# Patient Record
Sex: FEMALE | Race: WHITE | Employment: OTHER | URBAN - METROPOLITAN AREA
[De-identification: names, ages, dates, MRNs, and addresses within clinical notes are randomized per-mention and may not be internally consistent; named-entity substitution may affect disease eponyms.]

---

## 2018-03-28 ENCOUNTER — HOSPITAL ENCOUNTER (OUTPATIENT)
Dept: PHYSICAL THERAPY | Age: 63
Discharge: HOME | End: 2018-03-28
Payer: MEDICARE

## 2018-03-28 PROCEDURE — 97165 OT EVAL LOW COMPLEX 30 MIN: CPT

## 2018-03-28 PROCEDURE — G8985 CARRY GOAL STATUS: HCPCS

## 2018-03-28 PROCEDURE — G8984 CARRY CURRENT STATUS: HCPCS

## 2018-03-28 PROCEDURE — 97110 THERAPEUTIC EXERCISES: CPT

## 2018-03-28 NOTE — PROGRESS NOTES
Hand Therapy Evaluation and Daily Note    Patient Name: Kelle Kerns  Date:3/28/2018  : 1955  Age: 70 y.o.y/o  [x]  Patient  Verified  Payor: [place] MEDICARE / Plan: [place] MEDICARE PART A & B / Product Type: Medicare /    Referring Provider: MD Brett Savage MD Visit:  4 weeks  Onset Date:  1 year ago  Surgical Date: N/A  Surgical Procedure: N/A    In time: 3:40  Out time: 4:15  Total Treatment Time (min): 35  Total Timed Codes (min): 35  1:1 Treatment Time ([facility] only): 35   Visit #: 1 of 6    Treatment Area: Right hand pain [M79.641]  Left hand pain [M79.642]    Precautions:    Hand Dominance: left handed   Hand Involved: bilateral    Total Evaluation Time:  25    History of Present Condition:  Patient is a 70 y.o. female with a chief complaint  of Baltazar hand pain and stiffness. Pt reports she began experiencing symptoms about 1 year ago. She recently reported X-rays were taken and revealed degenerative changes in baltazar hands with no abnormalities. She demonstrated decreased ability to make a tight full fist with right hand due to Chinmay's node on MF. Mild strength deficits noted in dominant L hand  and pinch. Pt provided and instructed in HEP including intrinsic stretches and digit AROM. Skilled OT services are warranted to follow-up with pt on HEP, to decrease baltazar hand pain, and improve dexterity to improve quality of life. Pain Rating:   Current: (0-no pain 10-debilitating pain) no   At best: (0-no pain 10-debilitating pain) no  At worst: (0-no pain 10-debilitating pain) mild  Location: bilateral hand  Type:  no   Better with: activity  Worse with: inactive    Medications/Allergies/Past Medical History:  See chart; reviewed with patient. HTN    Diagnostic Tests: X-rays completed 3/27/2018 revealing degenerative changes in joints.     Prior Level of Function: Independent with ADL and IADL activities without functional limitations    Current Level of Function:  Decreased finger mobility with functional tasks. Social History: Pt reports she lives with  in home. Occupation/Job Requirements: Retired; still travels twice a month for [employer] around country    Observation: Heberden's nodes and Chinmay's nodes on digits on Baltazar hands.   Scar/incision:   N/A  Location:  Baltazar hands     Palpation:  No tenderness with palpation to baltazar digits    Range of Motion:  Right Hand ROM   2nd 3rd 4th 5th Thumb   MP 90 90  90 90     90 90 90    DIP 55 70 90 90    PABD 245 250 270 270    RABD        COMBS          Left Hand ROM   2nd 3rd 4th 5th Thumb   MP 90 90 90 90     100 90 100    DIP 70 70 70 90    PABD        RABD        COMBS 260 260 250 280        Strength:   Measurements: Taken with Gabe Dynamometer, in Lbs   Level 2 3/28/2018  Date Date Date Date Date Date   Right 40         Left 35         Deficit 5         Change                Pinch Measurements: Taken with Pinch Gauge, in Lbs   (hand) 3/28/2018  Date Date Date Date Date   Lateral          Right 12        Left  11        Deficit 1        Change         Pad         Right 9        Left 7        Deficit         Change         Estiven         Right 10        Left 8        Deficit 2        Change           Sensation:    intact    Edema: Not tested    Special Tests:    Provocative test: Collateral Ligament tests: negative    ADLs  Feeding:        []MaxA   []ModA   []Tyron   [] CGA   []SBA   []Sophy   [x]Independent  UE Dressing:       []MaxA   []ModA   []Tyron   [] CGA   []SBA   []Sophy   [x]Independent  LE Dressing:       []MaxA   []ModA   []Tyron   [] CGA   []SBA   []Sophy   [x]Independent  Grooming:       []MaxA   []ModA   []Tyron   [] CGA   []SBA   []Sophy   [x]Independent  Toileting:       []MaxA   []ModA   []Tyron   [] CGA   []SBA   []Sophy   [x]Independent  Bathing:       []MaxA   []ModA   []Tyron   [] CGA   []SBA   []Sophy   [x]Independent  Light Meal Prep:    []MaxA   []ModA   []Tyron   [] CGA   []SBA   []Sophy [x]Independent  Household/Other: []MaxA   []ModA   []Tyron   [] CGA   []SBA   []Sophy   [x]Independent  Adaptive Equip:     []MaxA   []ModA   []Tyron   [] CGA   []SBA   []Sophy   []Independent  Driving:       []MaxA   []ModA   []Tyron   [] CGA   []SBA   []Sophy   [x]Independent      Todays Treatment:  Patient received an initial evaluation today followed by education as to diagnosis, precautions and treatment plan. Patient was provided with a basic home exercise program including digit AROM and intrinsic stretches. OBJECTIVE  10 min Therapeutic Exercise:  [x] See flow sheet :   Rationale: increase ROM and improve coordination to improve the patients ability to increase baltazar hands for functional use. With   [x] TE   [] TA   [] neuro   [] other: Patient Education: [x] Review HEP    [] Progressed/Changed HEP based on:   [] positioning   [] body mechanics   [] transfers   [] heat/ice application   [] Splint wear/care   [] Sensory re-education   [] scar management      [] other:      Pain Level (0-10 scale) post treatment: 0/10    Patient will continue to benefit from skilled OT services to modify and progress therapeutic interventions, address ROM deficits, address strength deficits and analyze and address soft tissue restrictions to attain goals. Assessment: She demonstrated decreased ability to make a tight full fist with right hand due to Chinmay's node on MF. Mild strength deficits noted in dominant L hand  and pinch. Short Term Goals: To be accomplished in 2  weeks:  1. Patient will be compliant with initial home exercise program to take an active role in their rehabilitation process. Status at Eval: Pt provided and instructed in initial HEP  2. Patient will demonstrate a good understanding of their condition and strategies for self-management. Status at Eval: provided and instructed in joint protection strategies  Long Term Goals: To be accomplished in 4 weeks:   1.  Patient will regain 250 degrees total arc of motion of the baltazar hand digits to enable grasp of cylindrical objects such as a glass, handle or toothbrush. 2. Patient will report no difficulty opening a jar on FOTO outcome measure in order to demonstrate improved functional performance. 3. Pt will have 40 pounds of  in the left hand to allow for functional grasp for all ADL activities including dressing, bathing and self care. At Eval: 35    Frequency / Duration: Patient to be seen 2 times per week for 3 weeks:    Patient/ Caregiver education and instruction: Diagnosis, prognosis, exercises    Functional Status Measure:  Patient's:73  FOTO Benchmark: 76  Expected Change: 3  FOTO score based on 0 - 100 point scale, with 100 being no impairment. These scores are determined by patient reported functional assessments compared against national benchmarked data.     Carry   Current  CJ= 20-39%    Goal  CJ= 20-39%    The severity rating is based on clinical judgment and the FOTO score.     Certification Period: 3/28/2018 - 4/27/2018    Ana María Alcocer OT 3/28/2018 3:40 PM

## 2018-03-28 NOTE — PROGRESS NOTES
[facility]  [address]  [place], [address]  [phone] fax    Plan of Care/Statement of Necessity for Occupational Therapy Services    Patient name: Judy Oakley Start of Care: 3/28/2018   Referral source: Laly Barahona MD : 1955    Medical Diagnosis: Right hand pain [M79.641]  Left hand pain [M79.642]   Onset Date:1 year ago    Treatment Diagnosis: Baltazar hand pain   Prior Hospitalization: see medical history Provider#: [id number]   Medications: Verified on Patient summary List    Comorbidities: HTN   Prior Level of Function: Independent with ADL and IADL activities without functional limitations. The Plan of Care and following information is based on the information from the initial evaluation. Assessment/ key information: Patient is a 70 y.o. female with a chief complaint  of Baltazar hand pain and stiffness. Pt reports she began experiencing symptoms about 1 year ago. She recently reported X-rays were taken and revealed degenerative changes in baltazar hands with no abnormalities. She demonstrated decreased ability to make a tight full fist with right hand due to Chinmay's node on MF. Mild strength deficits noted in dominant L hand  and pinch. Pt provided and instructed in HEP including intrinsic stretches and digit AROM. Skilled OT services are warranted to follow-up with pt on HEP, to decrease baltazar hand pain, and improve dexterity to improve quality of life.     Evaluation Complexity: History LOW Complexity : Brief history review  Examination LOW Complexity : 1-3 performance deficits relating to physical, cognitive , or psychosocial skils that result in activity limitations and / or participation restrictions  Clinical Decision Making LOW Complexity : No comorbidities that affect functional and no verbal or physical assistance needed to complete eval tasks   Overall Complexity Rating: LOW     Problem List: Pain effecting function and Decreased range of motion Decreased Strength, Decreased coordination/prehension     Treatment Plan may include any combination of the following: Therapeutic exercise, Therapeutic activities and Physical agent/modality    Patient / Family readiness to learn indicated by: asking questions, trying to perform skills and interest    Persons(s) to be included in education:   patient (P)    Barriers to Learning/Limitations: None    Patient Goal (s): To learn to keep my fingers stretched and more flexible.     Patient Self Reported Health Status: good    Rehabilitation Potential: good    Short Term Goals: To be accomplished in 2  weeks:  1. Patient will be compliant with initial home exercise program to take an active role in their rehabilitation process. Status at Eval: Pt provided and instructed in initial HEP  2. Patient will demonstrate a good understanding of their condition and strategies for self-management. Status at Eval: provided and instructed in joint protection strategies  Long Term Goals: To be accomplished in 4 weeks:                        1. Patient will regain 250 degrees total arc of motion of the baltazar hand digits to enable grasp of cylindrical objects such as a glass, handle or toothbrush. 2. Patient will report no difficulty opening a jar on FOTO outcome measure in order to demonstrate improved functional performance. 3. Pt will have 40 pounds of  in the left hand to allow for functional grasp for all ADL activities including dressing, bathing and self care. At Eval: 35     Frequency / Duration: Patient to be seen 2 times per week for 3 weeks:     Patient/ Caregiver education and instruction: Diagnosis, prognosis, exercises  [x]  Plan of care has been reviewed with CARLOS Mixon   Current  CJ= 20-39%    Goal  CJ= 20-39%    The severity rating is based on clinical judgment and the FOTO score.     Certification Period: 3/28/2018 - 4/27/2018    Lucien Redding OT 3/28/2018 7:52 PM    ________________________________________________________________________    I certify that the above Therapy Services are being furnished while the patient is under my care. I agree with the treatment plan and certify that this therapy is necessary.     Physician's Signature:____________________  Date:____________Time:__________    Please sign and return to [facility] [address]  [address]  [place], [address]  [phone] fax

## 2018-03-30 ENCOUNTER — HOSPITAL ENCOUNTER (OUTPATIENT)
Dept: PHYSICAL THERAPY | Age: 63
Discharge: HOME | End: 2018-03-30
Payer: MEDICARE

## 2018-03-30 PROCEDURE — 97110 THERAPEUTIC EXERCISES: CPT

## 2018-03-30 NOTE — PROGRESS NOTES
OT DAILY TREATMENT NOTE - [facility] 3-16    Patient Name: Panda Cronin  Date:3/30/2018  : 1955  [x]  Patient  Verified  Payor: [place] MEDICARE / Plan: VA MEDICARE PART A & B / Product Type: Medicare /    In time: 5:00  Out time: 5:33  Total Treatment Time (min): 33  Total Timed Codes (min): 33  1:1 Treatment Time ( only): 23   Visit #: 2 of 6    Treatment Area: Right hand pain [M79.641]  Left hand pain [M79.642]    SUBJECTIVE  Pain Level (0-10 scale): 0/10  Any medication changes, allergies to medications, adverse drug reactions, diagnosis change, or new procedure performed?: [x] No    [] Yes (see summary sheet for update)  Subjective functional status/changes:   [x] No changes reported    OBJECTIVE     Modality rationale: decrease inflammation, decrease pain and increase tissue extensibility to improve the patients ability to move digits thru pain free ROM.    Min Type Additional Details    [] Estim:  []Unatt       []IFC  []Premod                        []Other:  []w/ice   []w/heat  Position:  Location:    [] Estim: []Att    []TENS instruct  []NMES                    []Other:  []w/US   []w/ice   []w/heat  Position:  Location:    []  Traction: [] Cervical       []Lumbar                       [] Prone          []Supine                       []Intermittent   []Continuous Lbs:  [] before manual  [] after manual    []  Ultrasound: []Continuous   [] Pulsed                           []1MHz   []3MHz Location:  W/cm2:    []  Iontophoresis with dexamethasone         Location: [] Take home patch   [] In clinic   10 []  Ice     [x]  heat  Paraffin  []  Ice massage  []  Laser   []  Anodyne Position:   Location: Charles hands    []  Laser with stim  []  Other: Position:  Location:    []  Vasopneumatic Device Pressure:       [] lo [] med [] hi   Temperature: [] lo [] med [] hi   [x] Skin assessment post-treatment:  [x]intact []redness- no adverse reaction    []redness  adverse reaction:   23 min Therapeutic Exercise:  [x] See flow sheet :   Rationale: increase ROM, increase strength and improve coordination to improve the patients ability to perform tasks with good dexterity and prehension in order to improve quality of life. With   [x] TE   [] TA   [] neuro   [] other: Patient Education: [x] Review HEP    [] Progressed/Changed HEP based on:   [] positioning   [] body mechanics   [] transfers   [] heat/ice application   [] Splint wear/care   [] Sensory re-education   [] scar management      [] other:            Other Objective/Functional Measures: Range of Motion:  Right Hand ROM    2nd 3rd 4th 5th Thumb   MP 90 90  90 90      90 90 90     DIP 55 70 90 90     PABD 245 250 270 270     RABD             COMBS                Left Hand ROM    2nd 3rd 4th 5th Thumb   MP 90 90 90 90      100 90 100     DIP 70 70 70 90     PABD             RABD             COMBS 260 260 250 280           Strength:   Measurements: Taken with Gabe Dynamometer, in Lbs   Level 2 3/28/2018  Date Date Date Date Date Date   Right 36               Left 35               Deficit 5               Change                      Pinch Measurements: Taken with Pinch Gauge, in Lbs   (hand) 3/28/2018  Date Date Date Date Date   Lateral                Right 12             Left  11             Deficit 1             Change               Pad               Right 9             Left 7             Deficit               Change               Estiven               Right 10             Left 8             Deficit 2             Change                    Pain Level (0-10 scale) post treatment: 0/10    ASSESSMENT/Changes in Function: Pt tolerated therapeutic ex's well. Patient will continue to benefit from skilled OT services to modify and progress therapeutic interventions, address ROM deficits and address strength deficits to attain remaining goals.      [x]  See Plan of Care  []  See progress note/recertification  []  See Discharge Summary         Progress towards goals / Updated goals:  Short Term Goals: To be accomplished in 2  weeks:  1. Patient will be compliant with initial home exercise program to take an active role in their rehabilitation process. Status at Eval: Pt provided and instructed in initial HEP  2. Patient will demonstrate a good understanding of their condition and strategies for self-management. Status at [facility]: provided and instructed in joint protection strategies  Long Term Goals: To be accomplished in 4 weeks:                        1. Patient will regain 250 degrees total arc of motion of the baltazar hand digits to enable grasp of cylindrical objects such as a glass, handle or toothbrush. 2. Patient will report no difficulty opening a jar on FOTO outcome measure in order to demonstrate improved functional performance. 3. Pt will have 40 pounds of  in the left hand to allow for functional grasp for all ADL activities including dressing, bathing and self care. At Eval: 35    PLAN  []  Upgrade activities as tolerated     [x]  Continue plan of care  []  Update interventions per flow sheet       []  Discharge due to:_  []  Other:_      Bill Montoya OT 3/30/2018  5:14 PM    No future appointments.

## 2018-04-16 ENCOUNTER — HOSPITAL ENCOUNTER (OUTPATIENT)
Dept: PHYSICAL THERAPY | Age: 63
Discharge: HOME | End: 2018-04-16
Payer: MEDICARE

## 2018-04-16 PROCEDURE — 97110 THERAPEUTIC EXERCISES: CPT

## 2018-04-16 NOTE — PROGRESS NOTES
OT DAILY TREATMENT NOTE - [facility] 3-16    Patient Name: Joaquín Nichole  Date:2018  : 1955  [x]  Patient  Verified  Payor: VA MEDICARE / Plan: VA MEDICARE PART A & B / Product Type: Medicare /    In time: 2:47  Out time: 3:30  Total Treatment Time (min): 43  Total Timed Codes (min): 43  1:1 Treatment Time ( [address] only): 37   Visit #: 3 of 6    Treatment Area: Right hand pain [M79.641]  Left hand pain [M79.642]    SUBJECTIVE  Pain Level (0-10 scale): 0/10  Any medication changes, allergies to medications, adverse drug reactions, diagnosis change, or new procedure performed?: [x] No    [] Yes (see summary sheet for update)  Subjective functional status/changes:   [] No changes reported  \"I haven't been doing the home exercises as much but my hands feel okay. \"    OBJECTIVE     Modality rationale: decrease edema, decrease inflammation, decrease pain and increase tissue extensibility to improve the patients ability to move digits on both hands thru pain free ROM.    Min Type Additional Details    [] Estim:  []Unatt       []IFC  []Premod                        []Other:  []w/ice   []w/heat  Position:  Location:    [] Estim: []Att    []TENS instruct  []NMES                    []Other:  []w/US   []w/ice   []w/heat  Position:  Location:    []  Traction: [] Cervical       []Lumbar                       [] Prone          []Supine                       []Intermittent   []Continuous Lbs:  [] before manual  [] after manual    []  Ultrasound: []Continuous   [] Pulsed                           []1MHz   []3MHz Location:  W/cm2:    []  Iontophoresis with dexamethasone         Location: [] Take home patch   [] In clinic   10 []  Ice     [x]  Heat Paraffin  []  Ice massage  []  Laser   []  Anodyne Position:  Location: Charles hands    []  Laser with stim  []  Other: Position:  Location:    []  Vasopneumatic Device Pressure:       [] lo [] med [] hi   Temperature: [] lo [] med [] hi   [x] Skin assessment post-treatment:  []intact []redness- no adverse reaction    []redness  adverse reaction:   33 min Therapeutic Exercise:  [x] See flow sheet :   Rationale: increase ROM, increase strength and improve coordination to improve the patients ability to increase use of baltazar hands for functional tasks without increased pain. With   [x] TE   [] TA   [] neuro   [] other: Patient Education: [x] Review HEP    [] Progressed/Changed HEP based on:   [] positioning   [] body mechanics   [] transfers   [] heat/ice application   [] Splint wear/care   [] Sensory re-education   [] scar management      [] other:            Other Objective/Functional Measures: Range of Motion:  Right Hand ROM     2nd 3rd 4th 5th Thumb   MP 90 90  90 90       90 90 90      DIP 55 70 90 90      PABD 245 250 270 270      RABD                  COMBS                      Left Hand ROM     2nd 3rd 4th 5th Thumb   MP 90 90 90 90       100 90 100      DIP 70 70 70 90      PABD                  RABD                  COMBS 260 260 250 280           Strength:   Measurements: Taken with Gabe Dynamometer, in Lbs  [facility]  Date Date Date Date Date Date   Right 36                     Left 35                     Deficit 5                     Change                             Pinch Measurements: Taken with Pinch Gauge, in Lbs   (hand) 3/28/2018  Date Date Date Date Date   Lateral                      Right 12                  Left  11                  Deficit 1                  Change                     Pad                     Right 9                  Left 7                  Deficit                     Change                     Estiven                     Right 10                  Left 8                  Deficit 2                  Change                          Pain Level (0-10 scale) post treatment: 0/10    ASSESSMENT/Changes in Function: Pt has met STG's.  Pt challenged by small dexterity ball task with baltazar hands and required verbal and physical cueing to complete task x 3 minutes each hand. Patient will continue to benefit from skilled OT services to modify and progress therapeutic interventions, address ROM deficits and address strength deficits to attain remaining goals. [x]  See Plan of Care  []  See progress note/recertification  []  See Discharge Summary         Progress towards goals / Updated goals:  Short Term Goals: To be accomplished in 2  weeks:  1. Patient will be compliant with initial home exercise program to take an active role in their rehabilitation process. Goal Met 4/16/18  Status at [facility]: Pt provided and instructed in initial HEP  2. Patient will demonstrate a good understanding of their condition and strategies for self-management. Goal Met 4/16/18  Status at [facility]: provided and instructed in joint protection strategies  Long Term Goals: To be accomplished in 4 weeks:                        1. Patient will regain 250 degrees total arc of motion of the baltazar hand digits to enable grasp of cylindrical objects such as a glass, handle or toothbrush. 2. Patient will report no difficulty opening a jar on FOTO outcome measure in order to demonstrate improved functional performance. 3. Pt will have 40 pounds of  in the left hand to allow for functional grasp for all ADL activities including dressing, bathing and self care.   At Eval: 35    PLAN  []  Upgrade activities as tolerated     [x]  Continue plan of care  []  Update interventions per flow sheet       []  Discharge due to:_  []  Other:_      Yoli Santiago OT 4/16/2018  2:46 PM    Future Appointments  Date Time Provider Mariam Grossman   4/16/2018 3:00 PM Yoli Santiago OT [id number]

## 2018-04-19 ENCOUNTER — HOSPITAL ENCOUNTER (OUTPATIENT)
Dept: PHYSICAL THERAPY | Age: 63
Discharge: HOME | End: 2018-04-19
Payer: MEDICARE

## 2018-04-19 PROCEDURE — G8984 CARRY CURRENT STATUS: HCPCS

## 2018-04-19 PROCEDURE — G8986 CARRY D/C STATUS: HCPCS

## 2018-04-19 PROCEDURE — G8985 CARRY GOAL STATUS: HCPCS

## 2018-04-19 PROCEDURE — 97110 THERAPEUTIC EXERCISES: CPT

## 2018-04-19 NOTE — PROGRESS NOTES
OT DISCHARGE DAILY NOTE AND SUMMARY- [facility]     Date:2018  Patient name: Sydney Saeed Start of Care: 3/28/2018   Referral source: Yehuda Barahona MD : [id number]   Medical/Treatment Diagnosis: Right hand pain [M79.641]  Left hand pain [M79.642] Onset Date:1 year ago     Prior Hospitalization: see medical history Provider#: [id number]   Medications: Verified on Patient Summary List    Comorbidities: HTN  Prior Level of Function:Independent with ADL and IADL activities without functional limitations. Visits from Start of Care: 4    Missed Visits: 0    Reporting Period : 3/28/2018 to 2018  [x]  Patient  Verified  Payor: Renny Frankel / Plan: [place] MEDICARE PART A & B / Product Type: Medicare /    In time: 3:50  Out time: 4:30  Total Treatment Time (min): 40  Total Timed Codes (min): 30  1:1 Treatment Time ( only): 40   Visit #: 4 of 6    Treatment Area: Right hand pain [M79.641]  Left hand pain [M79.642]    SUBJECTIVE  Pain Level (0-10 scale): 0/10  Any medication changes, allergies to medications, adverse drug reactions, diagnosis change, or new procedure performed?: [x] No    [] Yes (see summary sheet for update)  Subjective functional status/changes:   [] No changes reported  \"I feel like I am stronger and I am more aware that I need to keep active to maintain the joint flexibility. \"    OBJECTIVE    Modality rationale: decrease edema, decrease inflammation, decrease pain and increase tissue extensibility to improve the patients ability to move baltazar hand digits thru pain-free ROM.    Min Type Additional Details    [] Estim:  []Unatt       []IFC  []Premod                        []Other:  []w/ice   []w/heat  Position:  Location:    [] Estim: []Att    []TENS instruct  []NMES                    []Other:  []w/US   []w/ice   []w/heat  Position:  Location:    []  Traction: [] Cervical       []Lumbar                       [] Prone          []Supine                       []Intermittent   []Continuous Lbs:  [] before manual  [] after manual    []  Ultrasound: []Continuous   [] Pulsed                           []1MHz   []3MHz W/cm2:  Location:    []  Iontophoresis with dexamethasone         Location: [] Take home patch   [] In clinic   10 []  Ice     [x]  heat Paraffin  []  Ice massage  []  Laser   []  Anodyne Position:  Location: baltazar hands    []  Laser with stim  []  Other:  Position:  Location:    []  Vasopneumatic Device Pressure:       [] lo [] med [] hi   Temperature: [] lo [] med [] hi   [x] Skin assessment post-treatment:  [x]intact []redness- no adverse reaction    []redness - adverse reaction:     30 min Therapeutic Exercise:  [x] See flow sheet :   Rationale: increase ROM, increase strength and improve coordination to improve the patients ability to return to PLOF.      With   [x] TE   [] TA   [] neuro   [] other: Patient Education: [x] Review HEP    [] Progressed/Changed HEP based on:   [] positioning   [] body mechanics   [] transfers   [] heat/ice application   [] Splint wear/care   [] Sensory re-education   [] scar management      [] other:            Other Objective/Functional Measures: Right and Left Hand ROM  4/19/2018  2nd  R/L 3rd  R/L 4th  R/L 5th  R/L Thumb   MP 90/90 90/90 90/90 90/90    /100 96/100 100/100 90/100    DIP 70/70 75/80 90/80 90/90    PABD        LIVD        COMBS 260/260 261/274 280/270 270/280      Range of Motion:  Right Hand ROM    3/28/2018 2nd 3rd 4th 5th Thumb   MP 90 90  90 90       90 90 90      DIP 55 70 90 90      PABD          RABD                  COMBS   245  250    270   270          Left Hand ROM    3/28/2018 2nd 3rd 4th 5th Thumb   MP 90 90 90 90       100 90 100      DIP 70 70 70 90      PABD                  RABD                  COMBS 260 260 250 280          Strength:   Measurements: Taken with Gabe Dynamometer, in Lbs  Christopher 2 3/28/2018  4/19/2018  Date Date Date Date Date   Right 40   40                  Left 35   35       Arlis Sumrall      Deficit 5 Ariana.Scrape                  Change      0                       Pinch Measurements: Taken with Pinch Gauge, in Lbs   (hand) 3/28/2018  4/19/2018  Date Date Date Date   Lateral                      Right 12  12                Left  11   11               Deficit 1   1               Change      0               Pad                     Right 9  10                Left 7   8               Deficit      2               Change      +1               Estiven                     Right 10 10                 Left 8  9                Deficit 2 1                 Change    +1                                         Pain Level (0-10 scale) post treatment: 0/10    Summary of Care:  Progress towards goals / Updated goals:  Short Term Goals: To be accomplished in 2  weeks:  1. Patient will be compliant with initial home exercise program to take an active role in their rehabilitation process. Goal Met 4/16/18  Status at [facility]: Pt provided and instructed in initial HEP  2. Patient will demonstrate a good understanding of their condition and strategies for self-management. Goal Met 4/16/18  Status at [facility]: provided and instructed in joint protection strategies  Long Term Goals: To be accomplished in 4 weeks:                        1. Patient will regain 250 degrees total arc of motion of the baltazar hand digits to enable grasp of cylindrical objects such as a glass, handle or toothbrush. Goal Met 4/19/18  2. Patient will report no difficulty opening a jar on FOTO outcome measure in order to demonstrate improved functional performance. Progressing 4/19/19  3. Pt will have 40 pounds of  in the left hand to allow for functional grasp for all ADL activities including dressing, bathing and self care. Progressing 4/19/18  At Eval: 35    ASSESSMENT/Changes in Function: Pt has either met or progressing towards stated goals and reports she feels stronger and more aware that she needs to keep active to maintain the joint flexibility in her hands.  Pt is compliant with initial HEP and was provided and instructed in  and pinch strengthening HEP and provided graded theraputty. Pt reports PLOF and will be d/c'd at this time. G-Codes (GO)  Carry   Current  CI= 1-19%    Goal  CJ= 20-39%   D/C  CI= 1-19%     The severity rating is based on clinical judgment and the FOTO score.     PLAN:  [x]Discontinue therapy: [x]Patient has reached or is progressing toward set goals      []Patient is non-compliant or has abdicated      []Due to lack of appreciable progress towards set goals    Thank you for this referral!    Macey Arizmendi OT 4/19/2018 3:45 PM